# Patient Record
Sex: MALE | Race: BLACK OR AFRICAN AMERICAN | NOT HISPANIC OR LATINO | Employment: OTHER | URBAN - METROPOLITAN AREA
[De-identification: names, ages, dates, MRNs, and addresses within clinical notes are randomized per-mention and may not be internally consistent; named-entity substitution may affect disease eponyms.]

---

## 2024-08-03 ENCOUNTER — APPOINTMENT (OUTPATIENT)
Dept: RADIOLOGY | Facility: HOSPITAL | Age: 44
End: 2024-08-03

## 2024-08-03 ENCOUNTER — CLINICAL SUPPORT (OUTPATIENT)
Dept: EMERGENCY MEDICINE | Facility: HOSPITAL | Age: 44
End: 2024-08-03

## 2024-08-03 ENCOUNTER — HOSPITAL ENCOUNTER (OUTPATIENT)
Facility: HOSPITAL | Age: 44
Setting detail: OBSERVATION
Discharge: HOME | End: 2024-08-04
Attending: EMERGENCY MEDICINE | Admitting: STUDENT IN AN ORGANIZED HEALTH CARE EDUCATION/TRAINING PROGRAM

## 2024-08-03 DIAGNOSIS — R60.0 LEG EDEMA, LEFT: ICD-10-CM

## 2024-08-03 DIAGNOSIS — R06.02 SHORTNESS OF BREATH: Primary | ICD-10-CM

## 2024-08-03 DIAGNOSIS — I50.9 CONGESTIVE HEART FAILURE, UNSPECIFIED HF CHRONICITY, UNSPECIFIED HEART FAILURE TYPE (MULTI): ICD-10-CM

## 2024-08-03 DIAGNOSIS — L03.116 CELLULITIS OF LEFT LOWER EXTREMITY: ICD-10-CM

## 2024-08-03 PROBLEM — I10 BENIGN ESSENTIAL HYPERTENSION: Status: ACTIVE | Noted: 2023-02-22

## 2024-08-03 PROBLEM — R73.03 PREDIABETES: Status: ACTIVE | Noted: 2024-02-19

## 2024-08-03 PROBLEM — K21.9 GASTROESOPHAGEAL REFLUX DISEASE: Status: ACTIVE | Noted: 2023-02-22

## 2024-08-03 PROBLEM — J96.12 CHRONIC RESPIRATORY FAILURE WITH HYPERCAPNIA (MULTI): Status: ACTIVE | Noted: 2023-02-22

## 2024-08-03 PROBLEM — F41.0 PANIC DISORDER: Status: ACTIVE | Noted: 2023-02-22

## 2024-08-03 PROBLEM — K58.9 IRRITABLE BOWEL SYNDROME: Status: ACTIVE | Noted: 2023-02-22

## 2024-08-03 PROBLEM — G47.33 OBSTRUCTIVE SLEEP APNEA SYNDROME: Status: ACTIVE | Noted: 2023-02-22

## 2024-08-03 PROBLEM — E66.2 EXTREME OBESITY WITH ALVEOLAR HYPOVENTILATION (MULTI): Status: ACTIVE | Noted: 2024-08-03

## 2024-08-03 PROBLEM — M79.601 CHRONIC PAIN OF RIGHT UPPER EXTREMITY: Status: ACTIVE | Noted: 2023-02-22

## 2024-08-03 PROBLEM — G89.29 CHRONIC PAIN OF RIGHT UPPER EXTREMITY: Status: ACTIVE | Noted: 2023-02-22

## 2024-08-03 PROBLEM — L03.90 CELLULITIS: Status: ACTIVE | Noted: 2024-08-03

## 2024-08-03 LAB
ALBUMIN SERPL BCP-MCNC: 3.1 G/DL (ref 3.4–5)
ALP SERPL-CCNC: 61 U/L (ref 33–120)
ALT SERPL W P-5'-P-CCNC: 21 U/L (ref 10–52)
ANION GAP SERPL CALC-SCNC: 10 MMOL/L (ref 10–20)
APPEARANCE UR: CLEAR
AST SERPL W P-5'-P-CCNC: 16 U/L (ref 9–39)
BASOPHILS # BLD AUTO: 0.04 X10*3/UL (ref 0–0.1)
BASOPHILS NFR BLD AUTO: 0.6 %
BILIRUB SERPL-MCNC: 0.9 MG/DL (ref 0–1.2)
BILIRUB UR STRIP.AUTO-MCNC: NEGATIVE MG/DL
BNP SERPL-MCNC: 170 PG/ML (ref 0–99)
BUN SERPL-MCNC: 13 MG/DL (ref 6–23)
CALCIUM SERPL-MCNC: 8.8 MG/DL (ref 8.6–10.6)
CARDIAC TROPONIN I PNL SERPL HS: 15 NG/L (ref 0–53)
CARDIAC TROPONIN I PNL SERPL HS: 19 NG/L (ref 0–53)
CHLORIDE SERPL-SCNC: 102 MMOL/L (ref 98–107)
CO2 SERPL-SCNC: 37 MMOL/L (ref 21–32)
COLOR UR: NORMAL
CREAT SERPL-MCNC: 1.04 MG/DL (ref 0.5–1.3)
EGFRCR SERPLBLD CKD-EPI 2021: >90 ML/MIN/1.73M*2
EOSINOPHIL # BLD AUTO: 0.11 X10*3/UL (ref 0–0.7)
EOSINOPHIL NFR BLD AUTO: 1.8 %
ERYTHROCYTE [DISTWIDTH] IN BLOOD BY AUTOMATED COUNT: 18.8 % (ref 11.5–14.5)
FLUAV RNA RESP QL NAA+PROBE: NOT DETECTED
FLUBV RNA RESP QL NAA+PROBE: NOT DETECTED
GLUCOSE SERPL-MCNC: 82 MG/DL (ref 74–99)
GLUCOSE UR STRIP.AUTO-MCNC: NORMAL MG/DL
HCT VFR BLD AUTO: 50.1 % (ref 41–52)
HGB BLD-MCNC: 15.7 G/DL (ref 13.5–17.5)
HOLD SPECIMEN: NORMAL
IMM GRANULOCYTES # BLD AUTO: 0.01 X10*3/UL (ref 0–0.7)
IMM GRANULOCYTES NFR BLD AUTO: 0.2 % (ref 0–0.9)
KETONES UR STRIP.AUTO-MCNC: NEGATIVE MG/DL
LEUKOCYTE ESTERASE UR QL STRIP.AUTO: NEGATIVE
LYMPHOCYTES # BLD AUTO: 1.34 X10*3/UL (ref 1.2–4.8)
LYMPHOCYTES NFR BLD AUTO: 21.6 %
MAGNESIUM SERPL-MCNC: 1.89 MG/DL (ref 1.6–2.4)
MCH RBC QN AUTO: 27.6 PG (ref 26–34)
MCHC RBC AUTO-ENTMCNC: 31.3 G/DL (ref 32–36)
MCV RBC AUTO: 88 FL (ref 80–100)
MONOCYTES # BLD AUTO: 0.67 X10*3/UL (ref 0.1–1)
MONOCYTES NFR BLD AUTO: 10.8 %
NEUTROPHILS # BLD AUTO: 4.03 X10*3/UL (ref 1.2–7.7)
NEUTROPHILS NFR BLD AUTO: 65 %
NITRITE UR QL STRIP.AUTO: NEGATIVE
NRBC BLD-RTO: 0 /100 WBCS (ref 0–0)
PH UR STRIP.AUTO: 6.5 [PH]
PLATELET # BLD AUTO: 110 X10*3/UL (ref 150–450)
POTASSIUM SERPL-SCNC: 5 MMOL/L (ref 3.5–5.3)
PROT SERPL-MCNC: 6.3 G/DL (ref 6.4–8.2)
PROT UR STRIP.AUTO-MCNC: NEGATIVE MG/DL
RBC # BLD AUTO: 5.69 X10*6/UL (ref 4.5–5.9)
RBC # UR STRIP.AUTO: NEGATIVE /UL
SARS-COV-2 RNA RESP QL NAA+PROBE: NOT DETECTED
SODIUM SERPL-SCNC: 144 MMOL/L (ref 136–145)
SP GR UR STRIP.AUTO: 1.01
UROBILINOGEN UR STRIP.AUTO-MCNC: NORMAL MG/DL
WBC # BLD AUTO: 6.2 X10*3/UL (ref 4.4–11.3)

## 2024-08-03 PROCEDURE — 71046 X-RAY EXAM CHEST 2 VIEWS: CPT | Performed by: RADIOLOGY

## 2024-08-03 PROCEDURE — 99223 1ST HOSP IP/OBS HIGH 75: CPT | Performed by: NURSE PRACTITIONER

## 2024-08-03 PROCEDURE — 81003 URINALYSIS AUTO W/O SCOPE: CPT

## 2024-08-03 PROCEDURE — 84075 ASSAY ALKALINE PHOSPHATASE: CPT

## 2024-08-03 PROCEDURE — 71046 X-RAY EXAM CHEST 2 VIEWS: CPT

## 2024-08-03 PROCEDURE — 83735 ASSAY OF MAGNESIUM: CPT

## 2024-08-03 PROCEDURE — 71275 CT ANGIOGRAPHY CHEST: CPT | Performed by: STUDENT IN AN ORGANIZED HEALTH CARE EDUCATION/TRAINING PROGRAM

## 2024-08-03 PROCEDURE — G0378 HOSPITAL OBSERVATION PER HR: HCPCS

## 2024-08-03 PROCEDURE — 96365 THER/PROPH/DIAG IV INF INIT: CPT | Mod: 59

## 2024-08-03 PROCEDURE — 84484 ASSAY OF TROPONIN QUANT: CPT

## 2024-08-03 PROCEDURE — 87636 SARSCOV2 & INF A&B AMP PRB: CPT

## 2024-08-03 PROCEDURE — 80053 COMPREHEN METABOLIC PANEL: CPT

## 2024-08-03 PROCEDURE — 36415 COLL VENOUS BLD VENIPUNCTURE: CPT

## 2024-08-03 PROCEDURE — 2550000001 HC RX 255 CONTRASTS: Performed by: EMERGENCY MEDICINE

## 2024-08-03 PROCEDURE — 99285 EMERGENCY DEPT VISIT HI MDM: CPT | Performed by: EMERGENCY MEDICINE

## 2024-08-03 PROCEDURE — 93971 EXTREMITY STUDY: CPT | Performed by: STUDENT IN AN ORGANIZED HEALTH CARE EDUCATION/TRAINING PROGRAM

## 2024-08-03 PROCEDURE — 85025 COMPLETE CBC W/AUTO DIFF WBC: CPT

## 2024-08-03 PROCEDURE — 99285 EMERGENCY DEPT VISIT HI MDM: CPT | Mod: 25

## 2024-08-03 PROCEDURE — 2500000004 HC RX 250 GENERAL PHARMACY W/ HCPCS (ALT 636 FOR OP/ED): Mod: JZ

## 2024-08-03 PROCEDURE — 93971 EXTREMITY STUDY: CPT

## 2024-08-03 PROCEDURE — 93005 ELECTROCARDIOGRAM TRACING: CPT

## 2024-08-03 PROCEDURE — 71275 CT ANGIOGRAPHY CHEST: CPT

## 2024-08-03 PROCEDURE — 83880 ASSAY OF NATRIURETIC PEPTIDE: CPT

## 2024-08-03 PROCEDURE — 93010 ELECTROCARDIOGRAM REPORT: CPT | Performed by: EMERGENCY MEDICINE

## 2024-08-03 RX ORDER — FUROSEMIDE 10 MG/ML
40 INJECTION INTRAMUSCULAR; INTRAVENOUS EVERY 12 HOURS
Status: DISCONTINUED | OUTPATIENT
Start: 2024-08-04 | End: 2024-08-04 | Stop reason: HOSPADM

## 2024-08-03 RX ORDER — ENOXAPARIN SODIUM 100 MG/ML
40 INJECTION SUBCUTANEOUS EVERY 12 HOURS SCHEDULED
Status: DISCONTINUED | OUTPATIENT
Start: 2024-08-03 | End: 2024-08-04 | Stop reason: HOSPADM

## 2024-08-03 RX ORDER — AMOXICILLIN 250 MG
2 CAPSULE ORAL 2 TIMES DAILY
Status: DISCONTINUED | OUTPATIENT
Start: 2024-08-03 | End: 2024-08-04 | Stop reason: HOSPADM

## 2024-08-03 RX ORDER — CEFAZOLIN SODIUM 2 G/100ML
2 INJECTION, SOLUTION INTRAVENOUS EVERY 8 HOURS
Status: DISCONTINUED | OUTPATIENT
Start: 2024-08-03 | End: 2024-08-04 | Stop reason: HOSPADM

## 2024-08-03 RX ORDER — IBUPROFEN 200 MG
TABLET ORAL AS NEEDED
COMMUNITY

## 2024-08-03 RX ORDER — CEFAZOLIN SODIUM 1 G/50ML
1 SOLUTION INTRAVENOUS EVERY 8 HOURS
Status: DISCONTINUED | OUTPATIENT
Start: 2024-08-03 | End: 2024-08-03

## 2024-08-03 RX ORDER — FUROSEMIDE 10 MG/ML
40 INJECTION INTRAMUSCULAR; INTRAVENOUS ONCE
Status: COMPLETED | OUTPATIENT
Start: 2024-08-03 | End: 2024-08-04

## 2024-08-03 RX ADMIN — IOHEXOL 100 ML: 350 INJECTION, SOLUTION INTRAVENOUS at 16:15

## 2024-08-03 RX ADMIN — CEFAZOLIN SODIUM 2 G: 2 INJECTION, SOLUTION INTRAVENOUS at 14:13

## 2024-08-03 SDOH — HEALTH STABILITY: MENTAL HEALTH
STRESS IS WHEN SOMEONE FEELS TENSE, NERVOUS, ANXIOUS, OR CAN'T SLEEP AT NIGHT BECAUSE THEIR MIND IS TROUBLED. HOW STRESSED ARE YOU?: NOT AT ALL

## 2024-08-03 SDOH — ECONOMIC STABILITY: HOUSING INSECURITY: AT ANY TIME IN THE PAST 12 MONTHS, WERE YOU HOMELESS OR LIVING IN A SHELTER (INCLUDING NOW)?: NO

## 2024-08-03 SDOH — SOCIAL STABILITY: SOCIAL NETWORK: HOW OFTEN DO YOU GET TOGETHER WITH FRIENDS OR RELATIVES?: MORE THAN THREE TIMES A WEEK

## 2024-08-03 SDOH — SOCIAL STABILITY: SOCIAL NETWORK: HOW OFTEN DO YOU ATTEND CHURCH OR RELIGIOUS SERVICES?: MORE THAN 4 TIMES PER YEAR

## 2024-08-03 SDOH — ECONOMIC STABILITY: INCOME INSECURITY: IN THE LAST 12 MONTHS, WAS THERE A TIME WHEN YOU WERE NOT ABLE TO PAY THE MORTGAGE OR RENT ON TIME?: YES

## 2024-08-03 SDOH — SOCIAL STABILITY: SOCIAL INSECURITY: DO YOU FEEL UNSAFE GOING BACK TO THE PLACE WHERE YOU ARE LIVING?: NO

## 2024-08-03 SDOH — SOCIAL STABILITY: SOCIAL INSECURITY: DOES ANYONE TRY TO KEEP YOU FROM HAVING/CONTACTING OTHER FRIENDS OR DOING THINGS OUTSIDE YOUR HOME?: NO

## 2024-08-03 SDOH — SOCIAL STABILITY: SOCIAL INSECURITY: HAVE YOU HAD THOUGHTS OF HARMING ANYONE ELSE?: NO

## 2024-08-03 SDOH — HEALTH STABILITY: MENTAL HEALTH
HOW OFTEN DO YOU NEED TO HAVE SOMEONE HELP YOU WHEN YOU READ INSTRUCTIONS, PAMPHLETS, OR OTHER WRITTEN MATERIAL FROM YOUR DOCTOR OR PHARMACY?: NEVER

## 2024-08-03 SDOH — SOCIAL STABILITY: SOCIAL INSECURITY
WITHIN THE LAST YEAR, HAVE YOU BEEN KICKED, HIT, SLAPPED, OR OTHERWISE PHYSICALLY HURT BY YOUR PARTNER OR EX-PARTNER?: NO

## 2024-08-03 SDOH — SOCIAL STABILITY: SOCIAL INSECURITY
WITHIN THE LAST YEAR, HAVE TO BEEN RAPED OR FORCED TO HAVE ANY KIND OF SEXUAL ACTIVITY BY YOUR PARTNER OR EX-PARTNER?: NO

## 2024-08-03 SDOH — SOCIAL STABILITY: SOCIAL INSECURITY: WERE YOU ABLE TO COMPLETE ALL THE BEHAVIORAL HEALTH SCREENINGS?: YES

## 2024-08-03 SDOH — HEALTH STABILITY: MENTAL HEALTH: HOW OFTEN DO YOU HAVE 6 OR MORE DRINKS ON ONE OCCASION?: NEVER

## 2024-08-03 SDOH — SOCIAL STABILITY: SOCIAL INSECURITY: WITHIN THE LAST YEAR, HAVE YOU BEEN AFRAID OF YOUR PARTNER OR EX-PARTNER?: NO

## 2024-08-03 SDOH — ECONOMIC STABILITY: HOUSING INSECURITY: IN THE PAST 12 MONTHS, HOW MANY TIMES HAVE YOU MOVED WHERE YOU WERE LIVING?: 1

## 2024-08-03 SDOH — ECONOMIC STABILITY: FOOD INSECURITY: WITHIN THE PAST 12 MONTHS, YOU WORRIED THAT YOUR FOOD WOULD RUN OUT BEFORE YOU GOT MONEY TO BUY MORE.: NEVER TRUE

## 2024-08-03 SDOH — HEALTH STABILITY: PHYSICAL HEALTH: ON AVERAGE, HOW MANY MINUTES DO YOU ENGAGE IN EXERCISE AT THIS LEVEL?: 0 MIN

## 2024-08-03 SDOH — SOCIAL STABILITY: SOCIAL NETWORK
DO YOU BELONG TO ANY CLUBS OR ORGANIZATIONS SUCH AS CHURCH GROUPS UNIONS, FRATERNAL OR ATHLETIC GROUPS, OR SCHOOL GROUPS?: NO

## 2024-08-03 SDOH — SOCIAL STABILITY: SOCIAL INSECURITY: HAVE YOU HAD ANY THOUGHTS OF HARMING ANYONE ELSE?: NO

## 2024-08-03 SDOH — HEALTH STABILITY: MENTAL HEALTH: HOW MANY STANDARD DRINKS CONTAINING ALCOHOL DO YOU HAVE ON A TYPICAL DAY?: 1 OR 2

## 2024-08-03 SDOH — SOCIAL STABILITY: SOCIAL INSECURITY: WITHIN THE LAST YEAR, HAVE YOU BEEN HUMILIATED OR EMOTIONALLY ABUSED IN OTHER WAYS BY YOUR PARTNER OR EX-PARTNER?: NO

## 2024-08-03 SDOH — ECONOMIC STABILITY: TRANSPORTATION INSECURITY
IN THE PAST 12 MONTHS, HAS LACK OF TRANSPORTATION KEPT YOU FROM MEETINGS, WORK, OR FROM GETTING THINGS NEEDED FOR DAILY LIVING?: NO

## 2024-08-03 SDOH — SOCIAL STABILITY: SOCIAL INSECURITY: ARE THERE ANY APPARENT SIGNS OF INJURIES/BEHAVIORS THAT COULD BE RELATED TO ABUSE/NEGLECT?: NO

## 2024-08-03 SDOH — HEALTH STABILITY: MENTAL HEALTH: HOW OFTEN DO YOU HAVE A DRINK CONTAINING ALCOHOL?: MONTHLY OR LESS

## 2024-08-03 SDOH — HEALTH STABILITY: PHYSICAL HEALTH: ON AVERAGE, HOW MANY DAYS PER WEEK DO YOU ENGAGE IN MODERATE TO STRENUOUS EXERCISE (LIKE A BRISK WALK)?: 0 DAYS

## 2024-08-03 SDOH — ECONOMIC STABILITY: INCOME INSECURITY: HOW HARD IS IT FOR YOU TO PAY FOR THE VERY BASICS LIKE FOOD, HOUSING, MEDICAL CARE, AND HEATING?: VERY HARD

## 2024-08-03 SDOH — ECONOMIC STABILITY: TRANSPORTATION INSECURITY
IN THE PAST 12 MONTHS, HAS THE LACK OF TRANSPORTATION KEPT YOU FROM MEDICAL APPOINTMENTS OR FROM GETTING MEDICATIONS?: NO

## 2024-08-03 SDOH — SOCIAL STABILITY: SOCIAL INSECURITY: ABUSE: ADULT

## 2024-08-03 SDOH — SOCIAL STABILITY: SOCIAL NETWORK: ARE YOU MARRIED, WIDOWED, DIVORCED, SEPARATED, NEVER MARRIED, OR LIVING WITH A PARTNER?: DIVORCED

## 2024-08-03 SDOH — ECONOMIC STABILITY: FOOD INSECURITY: WITHIN THE PAST 12 MONTHS, THE FOOD YOU BOUGHT JUST DIDN'T LAST AND YOU DIDN'T HAVE MONEY TO GET MORE.: NEVER TRUE

## 2024-08-03 SDOH — ECONOMIC STABILITY: INCOME INSECURITY: IN THE PAST 12 MONTHS, HAS THE ELECTRIC, GAS, OIL, OR WATER COMPANY THREATENED TO SHUT OFF SERVICE IN YOUR HOME?: NO

## 2024-08-03 SDOH — SOCIAL STABILITY: SOCIAL INSECURITY: HAS ANYONE EVER THREATENED TO HURT YOUR FAMILY OR YOUR PETS?: NO

## 2024-08-03 SDOH — SOCIAL STABILITY: SOCIAL NETWORK: HOW OFTEN DO YOU ATTENT MEETINGS OF THE CLUB OR ORGANIZATION YOU BELONG TO?: NEVER

## 2024-08-03 SDOH — SOCIAL STABILITY: SOCIAL INSECURITY: ARE YOU OR HAVE YOU BEEN THREATENED OR ABUSED PHYSICALLY, EMOTIONALLY, OR SEXUALLY BY ANYONE?: NO

## 2024-08-03 SDOH — SOCIAL STABILITY: SOCIAL INSECURITY: DO YOU FEEL ANYONE HAS EXPLOITED OR TAKEN ADVANTAGE OF YOU FINANCIALLY OR OF YOUR PERSONAL PROPERTY?: NO

## 2024-08-03 SDOH — SOCIAL STABILITY: SOCIAL NETWORK: IN A TYPICAL WEEK, HOW MANY TIMES DO YOU TALK ON THE PHONE WITH FAMILY, FRIENDS, OR NEIGHBORS?: NEVER

## 2024-08-03 ASSESSMENT — ENCOUNTER SYMPTOMS
NEUROLOGICAL NEGATIVE: 1
EYES NEGATIVE: 1
GASTROINTESTINAL NEGATIVE: 1
MUSCULOSKELETAL NEGATIVE: 1
CONSTITUTIONAL NEGATIVE: 1
CARDIOVASCULAR NEGATIVE: 1
SHORTNESS OF BREATH: 1

## 2024-08-03 ASSESSMENT — LIFESTYLE VARIABLES
HAVE YOU EVER FELT YOU SHOULD CUT DOWN ON YOUR DRINKING: NO
HOW MANY STANDARD DRINKS CONTAINING ALCOHOL DO YOU HAVE ON A TYPICAL DAY: PATIENT DOES NOT DRINK
HOW OFTEN DO YOU HAVE A DRINK CONTAINING ALCOHOL: NEVER
TOTAL SCORE: 0
SKIP TO QUESTIONS 9-10: 1
HAVE PEOPLE ANNOYED YOU BY CRITICIZING YOUR DRINKING: NO
AUDIT-C TOTAL SCORE: 0
EVER HAD A DRINK FIRST THING IN THE MORNING TO STEADY YOUR NERVES TO GET RID OF A HANGOVER: NO
HOW OFTEN DO YOU HAVE 6 OR MORE DRINKS ON ONE OCCASION: NEVER
PRESCIPTION_ABUSE_PAST_12_MONTHS: NO
EVER FELT BAD OR GUILTY ABOUT YOUR DRINKING: NO
SUBSTANCE_ABUSE_PAST_12_MONTHS: YES
AUDIT-C TOTAL SCORE: 1
SKIP TO QUESTIONS 9-10: 1
AUDIT-C TOTAL SCORE: 0

## 2024-08-03 ASSESSMENT — COGNITIVE AND FUNCTIONAL STATUS - GENERAL
MOBILITY SCORE: 24
DAILY ACTIVITIY SCORE: 24
PATIENT BASELINE BEDBOUND: NO

## 2024-08-03 ASSESSMENT — PAIN SCALES - GENERAL
PAINLEVEL_OUTOF10: 7
PAINLEVEL_OUTOF10: 5 - MODERATE PAIN
PAINLEVEL_OUTOF10: 4

## 2024-08-03 ASSESSMENT — PATIENT HEALTH QUESTIONNAIRE - PHQ9
1. LITTLE INTEREST OR PLEASURE IN DOING THINGS: NOT AT ALL
SUM OF ALL RESPONSES TO PHQ9 QUESTIONS 1 & 2: 0
2. FEELING DOWN, DEPRESSED OR HOPELESS: NOT AT ALL

## 2024-08-03 ASSESSMENT — ACTIVITIES OF DAILY LIVING (ADL)
HEARING - RIGHT EAR: FUNCTIONAL
GROOMING: INDEPENDENT
PATIENT'S MEMORY ADEQUATE TO SAFELY COMPLETE DAILY ACTIVITIES?: YES
JUDGMENT_ADEQUATE_SAFELY_COMPLETE_DAILY_ACTIVITIES: YES
DRESSING YOURSELF: INDEPENDENT
WALKS IN HOME: INDEPENDENT
ADEQUATE_TO_COMPLETE_ADL: YES
TOILETING: INDEPENDENT
FEEDING YOURSELF: INDEPENDENT
HEARING - LEFT EAR: FUNCTIONAL
BATHING: INDEPENDENT

## 2024-08-03 ASSESSMENT — COLUMBIA-SUICIDE SEVERITY RATING SCALE - C-SSRS
2. HAVE YOU ACTUALLY HAD ANY THOUGHTS OF KILLING YOURSELF?: NO
1. IN THE PAST MONTH, HAVE YOU WISHED YOU WERE DEAD OR WISHED YOU COULD GO TO SLEEP AND NOT WAKE UP?: NO
6. HAVE YOU EVER DONE ANYTHING, STARTED TO DO ANYTHING, OR PREPARED TO DO ANYTHING TO END YOUR LIFE?: NO

## 2024-08-03 ASSESSMENT — PAIN - FUNCTIONAL ASSESSMENT: PAIN_FUNCTIONAL_ASSESSMENT: 0-10

## 2024-08-03 NOTE — ED PROVIDER NOTES
Emergency Department Provider Note        History of Present Illness     History provided by: Patient  Limitations to History: None  External Records Reviewed with Brief Summary: None    HPI:  Rome Casey is a 43 y.o. male with past medical history of sleep apnea presenting today for left leg swelling, urinary frequency, and shortness of breath.  Patient states this started about 10 days ago.  Patient endorses shortness of breath predominantly with exertion. Patient does smoke marijuana, denies recent travel, denies cough/hemoptysis, denies any history of clots.  Patient rarely uses alcohol and does use oxygen occasionally for sleep apnea.    Physical Exam   Triage vitals:  T 36.9 °C (98.5 °F)  HR (!) 102  BP (!) 146/103  RR 18  O2 (!) 83 % (92 on 3liters) None (Room air)    General: Awake, alert, in no acute distress  Eyes: Gaze conjugate.  No scleral icterus or injection  HENT: Normo-cephalic, atraumatic. No stridor  CV: Regular rate, regular rhythm. Posterior Tibial pulses 2+ bilaterally  Resp: Breathing non-labored, speaking in full sentences.  Clear to auscultation bilaterally. O2 sats are 94% on 3L.   MSK/Extremities: No gross bony deformities. Moving all extremities.  Left lower leg erythema with 1+ edema to the shin on the left.  0+ edema on the right.  Pain in the calf with dorsiflexion of the left ankle.  Left lower leg noticeably warmer than the right.  Skin: Warm. Appropriate color.  Erythema of the left lower leg.  Neuro: Alert. Oriented. Face symmetric. Speech is fluent.    Psych: Appropriate mood and affect    Medical Decision Making & ED Course   Medical Decision Makin y.o. male with past medical history of sleep apnea presenting today for left leg swelling, urinary frequency, and shortness of breath.  Upon initial evaluation he is well-appearing with stable vital signs.  Physical exam notable for satting 94% on 3 L of oxygen, and erythema/swelling/edema of the left lower  extremity.  Differential diagnosis includes DVT, covid, flu, PE, UTI, cellulitis, ACS.   UA was negative, additional labs revealed an elevated bicarbonate and BNP possibly related to chronic COPD and pulmonary hypertension.  Patient is COVID and influenza negative.  EKG indicated right axis deviation. CXR revealed cardiomegaly and moderate pleural effusion. CT imaging revealed no acute pathology, borderline right ventricle enlargement, and a 9mm ground glass nodularity.   Upon reevaluation he appears more fatigued and is now requiring 4 L of oxygen and satting at 94%.  Considering his new oxygen requirement and potential signs of right-sided heart large man it was deemed necessary for admission and further workup of shortness of breath.  DVT ultrasound still pending.  ----    Differential diagnoses considered include but are not limited to: DVT, covid, flu, PE, UTI, cellulitis     Social Determinants of Health which Significantly Impact Care: None identified     EKG Independent Interpretation: The EKG obtained at 0955 was independently interpreted by myself. It demonstrates sinus rhythm with a ventricular rate of 98. Right axis deviation. Intervals within normal limits. ST segments showed no signs of elevation or depression. Increase right axis deviation when compared to prior EKG from 03/2020.     Independent Result Review and Interpretation:  CT PE revealed no acute pathology, borderline right ventricle enlargement, and a 9mm ground glass nodularity.     Chronic conditions affecting the patient's care: As documented above in [facility]    The patient was discussed with the following consultants/services: Admission Coordinator who accepted the patient for admission    Care Considerations: As documented above in [facility]    ED Course:  Diagnoses as of 08/03/24 1849   Shortness of breath   Cellulitis of left lower extremity     Disposition   As a result of their workup, the patient will require admission to the hospital.  The  patient was informed of his diagnosis.  The patient was given the opportunity to ask questions and I answered them. The patient agreed to be admitted to the hospital.    Procedures     Patient seen and discussed with ED attending physician.    Andrés Davila DO  Emergency Medicine       Andrés Davila DO  Resident  08/03/24 [id number]

## 2024-08-03 NOTE — H&P
HPI     Mr Casey is a 43y male with PMHx: JESIKA who presented to the ED today for c/o left leg pain and swelling over the past 10 days.  States symptoms have been worsening and it hurts too much to walk on it anymore.  Patient also c/o SOB states he has had this for several years and he was diagnosed with JESIKA w/CPAP and used the CPAP for 2-3 years with no relief from the daytime SOB.   Patient states he only smoked for about 8 years and only did it socially and quit 7 years ago.  Lungs were clear and patient denies any chest pain, cough/congestion.  On exam his LLE was red, warm and swollen.    While in the ED patient's vitals were found to be WNL except BP was 154/101 and PO 83% on R/A doing well w/oxygen.  Patient Labs were also WNL except  but patient is obese.  CXR showed mild pulm edema and enlarged heart.  Patient to be admitted observation for LLE cellulitis.    ROS/EXAM     Review of Systems   Constitutional: Negative.    HENT: Negative.     Eyes: Negative.    Respiratory:  Positive for shortness of breath.    Cardiovascular: Negative.    Gastrointestinal: Negative.    Genitourinary: Negative.    Musculoskeletal: Negative.         LLE edema and pain   Skin: Negative.    Neurological: Negative.    All other systems reviewed and are negative.    Physical Exam  Vitals reviewed.   Constitutional:       Appearance: Normal appearance.   HENT:      Head: Normocephalic.      Mouth/Throat:      Mouth: Mucous membranes are dry.   Eyes:      Extraocular Movements: Extraocular movements intact.      Conjunctiva/sclera: Conjunctivae normal.      Pupils: Pupils are equal, round, and reactive to light.   Cardiovascular:      Rate and Rhythm: Normal rate and regular rhythm.      Pulses: Normal pulses.      Heart sounds: Normal heart sounds, S1 normal and S2 normal.   Pulmonary:      Effort: Pulmonary effort is normal.      Breath sounds: Normal breath sounds and air entry.   Abdominal:      General: Abdomen is  flat. Bowel sounds are normal.      Palpations: Abdomen is soft.   Musculoskeletal:         General: Normal range of motion.      Cervical back: Full passive range of motion without pain and normal range of motion.      Left lower leg: Edema present.   Skin:     General: Skin is warm and dry.   Neurological:      General: No focal deficit present.      Mental Status: He is alert and oriented to person, place, and time. Mental status is at baseline.   Psychiatric:         Attention and Perception: Attention normal.         Mood and Affect: Mood normal.         Speech: Speech normal.       Histories     Past Medical History:   Diagnosis Date    GERD (gastroesophageal reflux disease)     Hypertension     Other abnormal glucose 10/22/2020    Abnormal glucose    Other abnormal glucose 01/27/2020    Abnormal glucose    Other conditions influencing health status 07/11/2018    No pertinent past psychiatric history    Other specified health status 07/11/2018    No pertinent past medical history    Other specified health status 07/11/2018    No pertinent past surgical history    Personal history of other specified conditions 09/30/2019    History of snoring      Past Surgical History:   Procedure Laterality Date    OTHER SURGICAL HISTORY  01/11/2021    [place] tooth extraction      (Not in a hospital admission)    Allergies   Allergen Reactions    Shellfish Containing Products Anaphylaxis and Swelling    Codeine Hives and Unknown     Other reaction(s): Hives, Hives/Urticaria      Social History     Tobacco Use    Smoking status: Former     Types: Cigarettes    Smokeless tobacco: Never    Tobacco comments:     States smoked socially for about 8 years and has quit about 7 years ago   Substance Use Topics    Alcohol use: Not Currently      Family History   Problem Relation Name Age of Onset    Diabetes Mother's Brother      Diabetes Maternal Grandmother       Vitals/LABS/RESULTS     Last Recorded Vitals  Blood pressure (!)  "154/101, pulse 93, temperature 37 °C (98.6 °F), temperature source Oral, resp. rate 20, height 1.778 m (5' 10\"), weight (!) 154 kg (340 lb), SpO2 (!) 93%.  Intake/Output last 3 Shifts:  No intake/output data recorded.    Relevant Results  Lab Results   Component Value Date    WBC 6.2 08/03/2024    HGB 15.7 08/03/2024    HCT 50.1 08/03/2024    MCV 88 08/03/2024     (L) 08/03/2024      Lab Results   Component Value Date    GLUCOSE 82 08/03/2024    CALCIUM 8.8 08/03/2024     08/03/2024    K 5.0 08/03/2024    CO2 37 (H) 08/03/2024     08/03/2024    BUN 13 08/03/2024    CREATININE 1.04 08/03/2024     CT angio chest for pulmonary embolism    Result Date: 8/3/2024  Interpreted By:  Cameron Julio  and Eric Simpson STUDY: CT ANGIO CHEST FOR PULMONARY EMBOLISM;  8/3/2024 4:58 pm   INDICATION: Signs/Symptoms:left leg swelling and shortness of breath.   COMPARISON: CT abdomen and pelvis 06/24/2020 and CT PE 10/22/2019   ACCESSION NUMBER(S): [id number]   ORDERING CLINICIAN: ROLO TAYLOR   TECHNIQUE: Helical data acquisition of the chest was obtained after intravenous administration of 100 mL of Omnipaque 350, as per PE protocol. Images were reformatted in coronal and sagittal planes. Axial and coronal maximum intensity projection (MIP) images were created and reviewed.   FINDINGS: POTENTIAL LIMITATIONS OF THE STUDY: The assessment is limited by mild respiratory motion, high image noise and suboptimal contrast opacification of pulmonary arteries.   HEART AND VESSELS: No discrete filling defects within the main pulmonary artery or its branches to segmental level. Please note that, assessment of subsegmental branches is limited and small peripheral emboli are not entirely excluded. Main pulmonary artery and its branches are normal in caliber.   The thoracic aorta normal in course and caliber.Although, the study is not tailored for evaluation of aorta, there is no definite evidence of acute aortic " pathology. No coronary artery calcifications are seen. Please note, the study is not optimized for evaluation of coronary arteries.   There is borderline enlargement of right ventricle..There are no findings to suggest right heart strain.   There is no pericardial effusion seen.   MEDIASTINUM AND TARAN, LOWER NECK AND AXILLA: The visualized thyroid gland is within normal limits. No evidence of thoracic lymphadenopathy by CT criteria. Esophagus appears within normal limits as seen.   LUNGS AND AIRWAYS: The trachea and central airways are patent. No endobronchial lesion is seen.   There is a 9 mm ground-glass nodular density within the right lower lobe (image 190, series 402), new from prior CT scan dated 10/22/2019. There is a additional new 3 mm subpleural solid nodule within right middle lobe on axial image 160, series 402), which may represent a benign intrapulmonary lymph node. There is mild bandlike bibasilar atelectasis, right more than left. Otherwise, there is no focal consolidation, pleural effusion or pneumothorax. UPPER ABDOMEN: The visualized subdiaphragmatic structures demonstrate no remarkable findings.   CHEST WALL AND OSSEOUS STRUCTURES: Note is made of bilateral gynecomastia. Otherwise, chest wall is within normal limits. No acute osseous pathology.There are no suspicious osseous lesions.Mild multilevel degenerative changes within visualized spine.       1. No acute pathology, no evidence of acute pulmonary embolism to segmental level. Please note that, assessment of subsegmental branches is limited and small peripheral emboli are not entirely excluded. 2. Borderline enlargement of right ventricle. Further evaluation with echocardiogram can be obtained as clinically warranted. 3. A 9 mm ground-glass nodular density within right lower lobe, which is new when compared to prior CT scan from 2019. Attention on follow-up noncontrast chest CT in 6-12 months is recommended for persistence, then consider  chest CT every 2 years until 5 years.   I personally reviewed the images/study and I agree with the findings as stated by Calvin Chavarria MD (Radiology Resident). This study was interpreted at [facility], [place], [place].   MACRO: Incidental Finding:   A ground glass pulmonary nodule measuring 6 mm or larger.  (**-YCF-**)   Instructions:  Consider follow up non contrast chest CT at 6-12 months to confirm persistence, then consider CT chest every 2 years until 5 years. (Shaun Christy et al., Guidelines for management of incidental pulmonary nodules detected on CT images: From the Fleischner Society 2017, Radiology. 2017 Jul;284 (1):228-243.) FLEISCHNER.ACR.IF.8   Signed by: Cameron Julio 8/3/2024 5:08 PM Dictation workstation:   [id number]    XR chest 2 views    Result Date: 8/3/2024  Interpreted By:  Bret Hodgson and Bartolomei Aguilar Christopher STUDY: XR CHEST 2 VIEWS;  8/3/2024 11:00 am   INDICATION: Signs/Symptoms:shortness of breath.   COMPARISON: Chest radiograph 03/17/2020   ACCESSION NUMBER(S): [id number]   ORDERING CLINICIAN: CRYSTAL RAMON   FINDINGS: PA and lateral radiographs of the chest were provided.       CARDIOMEDIASTINAL SILHOUETTE: The cardiomediastinal silhouette is mildly enlarged.   LUNGS: Bilateral perihilar and interstitial prominence, which in the setting of cardiomegaly may reflect pulmonary edema, and is to be correlated patient's volume status. No focal consolidation. No sizable pleural effusion. No pneumothorax.   ABDOMEN: No remarkable upper abdominal findings.   BONES: No acute osseous changes.       1.  Cardiomegaly with findings suggestive of mild pulmonary edema, to be correlated with patient's volume status. Otherwise, no focal consolidation, sizeable pleural effusion or pneumothorax.   I personally reviewed the images/study and I agree with the findings as stated by Calvin Chavarria MD (Radiology Resident).  This study was interpreted at [facility], [place], [place].   MACRO: None   Signed by: Bret Hodgson 8/3/2024 11:34 AM Dictation workstation:   [id number]      Medications     Scheduled medications  ceFAZolin, 2 g, intravenous, q8h  enoxaparin, 40 mg, subcutaneous, q12h NAZANIN  furosemide, 40 mg, intravenous, Once  sennosides-docusate sodium, 2 tablet, oral, BID      Continuous medications     PRN medications      PLAN     Mr Casey is a 43y male with PMHx: JESIKA who presented to the ED today for c/o left leg pain and swelling over the past 10 days.  States symptoms have been worsening and it hurts too much to walk on it anymore.  Patient also c/o SOB states he has had this for several years and he was diagnosed with JESIKA w/CPAP and used the CPAP for 2-3 years with no relief from the daytime SOB.   Patient states he only smoked for about 8 years and only did it socially and quit 7 years ago.  Lungs were clear and patient denies any chest pain, cough/congestion.  On exam his LLE was red, warm and swollen.    While in the ED patient's vitals were found to be WNL except BP was 154/101 and PO 83% on R/A doing well w/oxygen.  Patient Labs were also WNL except  but patient is obese.  CXR showed mild pulm edema and enlarged heart.  Patient to be admitted observation for LLE cellulitis.  Assessment/Plan:    LLE Cellulits  -DVT U/S pending  -will continue with Cefazolin 2gm IV q8hr as started in ED will transition to PO    JESIKA  SOB  -will need outpatient follow up with Pulm and PCP and will order outpatient Echo  -Lasix 40mg IV x1 dose  -strict I/O    Fluids: monitor and replete as needed  Electrolytes: monitor and replete as needed  Nutrition: Regular diet   GI prophylaxis: as needed  DVT prophylaxis: SCD/Lovenox  Code Status: Full Code  PCP: Needs PCP  [facility]: [facility]    Disposition:  Admitted for above diagnoses. Plan per above. Anticipate  observation stay.      Eugenie Keith, APRN-CNP         I spent 75 minutes in the professional and overall care on this encounter before, during and after the visit, examining the patient, reviewing labs, writing orders and documenting the note

## 2024-08-04 VITALS
HEIGHT: 70 IN | SYSTOLIC BLOOD PRESSURE: 147 MMHG | DIASTOLIC BLOOD PRESSURE: 94 MMHG | TEMPERATURE: 97.7 F | HEART RATE: 93 BPM | OXYGEN SATURATION: 95 % | WEIGHT: 315 LBS | BODY MASS INDEX: 45.1 KG/M2 | RESPIRATION RATE: 18 BRPM

## 2024-08-04 LAB
ATRIAL RATE: 98 BPM
P AXIS: 56 DEGREES
P OFFSET: 181 MS
P ONSET: 129 MS
PR INTERVAL: 170 MS
Q ONSET: 214 MS
QRS COUNT: 16 BEATS
QRS DURATION: 84 MS
QT INTERVAL: 354 MS
QTC CALCULATION(BAZETT): 451 MS
QTC FREDERICIA: 417 MS
R AXIS: 154 DEGREES
T AXIS: 33 DEGREES
T OFFSET: 391 MS
VENTRICULAR RATE: 98 BPM

## 2024-08-04 PROCEDURE — G0378 HOSPITAL OBSERVATION PER HR: HCPCS

## 2024-08-04 PROCEDURE — 2500000004 HC RX 250 GENERAL PHARMACY W/ HCPCS (ALT 636 FOR OP/ED): Mod: JZ | Performed by: NURSE PRACTITIONER

## 2024-08-04 PROCEDURE — 2500000004 HC RX 250 GENERAL PHARMACY W/ HCPCS (ALT 636 FOR OP/ED): Performed by: STUDENT IN AN ORGANIZED HEALTH CARE EDUCATION/TRAINING PROGRAM

## 2024-08-04 PROCEDURE — 96375 TX/PRO/DX INJ NEW DRUG ADDON: CPT

## 2024-08-04 PROCEDURE — 96376 TX/PRO/DX INJ SAME DRUG ADON: CPT

## 2024-08-04 PROCEDURE — 2500000004 HC RX 250 GENERAL PHARMACY W/ HCPCS (ALT 636 FOR OP/ED): Performed by: NURSE PRACTITIONER

## 2024-08-04 PROCEDURE — 99239 HOSP IP/OBS DSCHRG MGMT >30: CPT | Performed by: STUDENT IN AN ORGANIZED HEALTH CARE EDUCATION/TRAINING PROGRAM

## 2024-08-04 PROCEDURE — 96372 THER/PROPH/DIAG INJ SC/IM: CPT | Performed by: NURSE PRACTITIONER

## 2024-08-04 PROCEDURE — 2500000001 HC RX 250 WO HCPCS SELF ADMINISTERED DRUGS (ALT 637 FOR MEDICARE OP): Performed by: NURSE PRACTITIONER

## 2024-08-04 RX ORDER — SULFAMETHOXAZOLE AND TRIMETHOPRIM 800; 160 MG/1; MG/1
1 TABLET ORAL 2 TIMES DAILY
Qty: 14 TABLET | Refills: 0 | Status: SHIPPED | OUTPATIENT
Start: 2024-08-04 | End: 2024-08-11

## 2024-08-04 RX ORDER — ACETAMINOPHEN 325 MG/1
650 TABLET ORAL EVERY 6 HOURS PRN
Status: DISCONTINUED | OUTPATIENT
Start: 2024-08-04 | End: 2024-08-04 | Stop reason: HOSPADM

## 2024-08-04 RX ORDER — FUROSEMIDE 40 MG/1
40 TABLET ORAL DAILY
Qty: 5 TABLET | Refills: 0 | Status: SHIPPED | OUTPATIENT
Start: 2024-08-04 | End: 2024-08-09

## 2024-08-04 RX ORDER — HYDRALAZINE HYDROCHLORIDE 20 MG/ML
10 INJECTION INTRAMUSCULAR; INTRAVENOUS EVERY 4 HOURS PRN
Status: DISCONTINUED | OUTPATIENT
Start: 2024-08-04 | End: 2024-08-04 | Stop reason: HOSPADM

## 2024-08-04 RX ORDER — BUTALBITAL, ACETAMINOPHEN AND CAFFEINE 50; 325; 40 MG/1; MG/1; MG/1
1 TABLET ORAL EVERY 4 HOURS PRN
Status: DISCONTINUED | OUTPATIENT
Start: 2024-08-04 | End: 2024-08-04

## 2024-08-04 RX ADMIN — SENNOSIDES AND DOCUSATE SODIUM 2 TABLET: 50; 8.6 TABLET ORAL at 01:52

## 2024-08-04 RX ADMIN — ACETAMINOPHEN 650 MG: 325 TABLET ORAL at 09:58

## 2024-08-04 RX ADMIN — CEFAZOLIN SODIUM 2 G: 2 INJECTION, SOLUTION INTRAVENOUS at 10:41

## 2024-08-04 RX ADMIN — CEFAZOLIN SODIUM 2 G: 2 INJECTION, SOLUTION INTRAVENOUS at 01:45

## 2024-08-04 RX ADMIN — ENOXAPARIN SODIUM 40 MG: 100 INJECTION SUBCUTANEOUS at 01:46

## 2024-08-04 RX ADMIN — FUROSEMIDE 40 MG: 10 INJECTION, SOLUTION INTRAVENOUS at 09:48

## 2024-08-04 RX ADMIN — FUROSEMIDE 40 MG: 10 INJECTION, SOLUTION INTRAMUSCULAR; INTRAVENOUS at 01:46

## 2024-08-04 RX ADMIN — HYDRALAZINE HYDROCHLORIDE 10 MG: 20 INJECTION INTRAMUSCULAR; INTRAVENOUS at 09:48

## 2024-08-04 ASSESSMENT — COGNITIVE AND FUNCTIONAL STATUS - GENERAL
DAILY ACTIVITIY SCORE: 24
MOBILITY SCORE: 24

## 2024-08-04 ASSESSMENT — PAIN SCALES - GENERAL: PAINLEVEL_OUTOF10: 3

## 2024-08-04 NOTE — CARE PLAN
The patient's goals for the shift include      The clinical goals for the shift include      Over the shift, the patient did not make progress toward the following goals. Barriers to progression include PT denies pain . Recommendations to address these barriers include frequent monitor for pain.

## 2024-08-04 NOTE — CARE PLAN
Problem: Pain - Adult  Goal: Verbalizes/displays adequate comfort level or baseline comfort level  8/4/2024 1146 by Christiane Goodwin RN  Outcome: Progressing  8/4/2024 1145 by Christiane Goodwin RN  Outcome: Progressing     Problem: Safety - Adult  Goal: Free from fall injury  8/4/2024 1146 by Christiane Goodwin RN  Outcome: Progressing  8/4/2024 1145 by Christiane Goodwin RN  Outcome: Progressing     Problem: Discharge Planning  Goal: Discharge to home or other facility with appropriate resources  8/4/2024 1146 by Christiane Goodwin RN  Outcome: Progressing  8/4/2024 1145 by Christiane Goodwin RN  Outcome: Progressing     Problem: Chronic Conditions and Co-morbidities  Goal: Patient's chronic conditions and co-morbidity symptoms are monitored and maintained or improved  8/4/2024 1146 by Christiane Goodwin RN  Outcome: Progressing  8/4/2024 1145 by Christiane Goodwin RN  Outcome: Progressing     Problem: Pain  Goal: Takes deep breaths with improved pain control throughout the shift  8/4/2024 1146 by Christiane Goodwin RN  Outcome: Progressing  8/4/2024 1145 by Christiane Goodwin RN  Outcome: Progressing  Goal: Turns in bed with improved pain control throughout the shift  8/4/2024 1146 by Christiane Goodwin RN  Outcome: Progressing  8/4/2024 1145 by Christiane Goodwin RN  Outcome: Progressing  Goal: Walks with improved pain control throughout the shift  8/4/2024 1146 by Christiane Goodwin RN  Outcome: Progressing  8/4/2024 1145 by Christiane Goodwin RN  Outcome: Progressing  Goal: Performs ADL's with improved pain control throughout shift  8/4/2024 1146 by Christiane Goodwin RN  Outcome: Progressing  8/4/2024 1145 by Christiane Goodwin RN  Outcome: Progressing  Goal: Participates in PT with improved pain control throughout the shift  8/4/2024 1146 by Christiane Goodwin RN  Outcome: Progressing  8/4/2024 1145 by Christiane Goodwin RN  Outcome: Progressing  Goal: Free from opioid side effects throughout the shift  8/4/2024 1146 by  Christiane Goodwin RN  Outcome: Progressing  8/4/2024 1145 by Christiane Goodwin RN  Outcome: Progressing  Goal: Free from acute confusion related to pain meds throughout the shift  8/4/2024 1146 by Christiane Goodwin RN  Outcome: Progressing  8/4/2024 1145 by Christiane Goodwin RN  Outcome: Progressing   The patient's goals for the shift include      The clinical goals for the shift include pt to remain pain free

## 2024-08-04 NOTE — DISCHARGE SUMMARY
INTERNAL MEDICINE DISCHARGE SUMMARY             Discharge Diagnosis   Leg edema, left    Issues Requiring Follow-Up   Outpatient echo    Test Results Pending At Discharge     Pending Labs       No current pending labs.          Hospital Course     From [facility]:   Mr Casey is a 43y male with PMHx: JESIKA who presented to the ED today for c/o left leg pain and swelling over the past 10 days.  States symptoms have been worsening and it hurts too much to walk on it anymore.  Patient also c/o SOB states he has had this for several years and he was diagnosed with JESIKA w/CPAP and used the CPAP for 2-3 years with no relief from the daytime SOB.   Patient states he only smoked for about 8 years and only did it socially and quit 7 years ago.  Lungs were clear and patient denies any chest pain, cough/congestion.  On exam his LLE was red, warm and swollen. While in the ED patient's vitals were found to be WNL except BP was 154/101 and PO 83% on R/A doing well w/oxygen.  Patient Labs were also WNL except  but patient is obese.  CXR showed mild pulm edema and enlarged heart.  Patient to be admitted observation for LLE cellulitis.    Hospital course:   Patient was started on IV antibiotic. He was placed on 3L nasal canula, and successfully weaned off.  For elevated BNP patient received IV Lasix with recommendation to follow up with outpatient echocardiogram.      Pertinent Physical Exam At Time of Discharge     Physical Exam  Constitutional:       Appearance: Normal appearance.   Cardiovascular:      Rate and Rhythm: Normal rate and regular rhythm.      Pulses: Normal pulses.      Heart sounds: Normal heart sounds, S1 normal and S2 normal.   Pulmonary:      Effort: Pulmonary effort is normal.      Breath sounds: Normal breath sounds and air entry.   Abdominal:      General: Abdomen is flat. Bowel sounds are normal.      Palpations: Abdomen is soft.   Musculoskeletal:          General: Normal range of motion.      Cervical back: Full passive range of motion without pain and normal range of motion.      Left lower leg: Edema present.   Skin:     General: Skin is warm and dry.   Neurological:      General: No focal deficit present.      Mental Status: He is alert and oriented to person, place, and time. Mental status is at baseline.   Psychiatric:         Attention and Perception: Attention normal.         Mood and Affect: Mood normal.         Speech: Speech normal.   Home Medications        Medication List      START taking these medications     furosemide 40 mg tablet; Commonly known as: Lasix; Take 1 tablet (40 mg)   by mouth once daily for 5 days.   sulfamethoxazole-trimethoprim 800-160 mg tablet; Commonly known as:   Bactrim DS; Take 1 tablet by mouth 2 times a day for 7 days.     CONTINUE taking these medications     glucose 4 gram chewable tablet     Outpatient Follow-Up     No future appointments.    Bib Saenz MD

## 2024-08-04 NOTE — CARE PLAN
Problem: Pain - Adult  Goal: Verbalizes/displays adequate comfort level or baseline comfort level  Outcome: Progressing     Problem: Safety - Adult  Goal: Free from fall injury  Outcome: Progressing     Problem: Discharge Planning  Goal: Discharge to home or other facility with appropriate resources  Outcome: Progressing     Problem: Chronic Conditions and Co-morbidities  Goal: Patient's chronic conditions and co-morbidity symptoms are monitored and maintained or improved  Outcome: Progressing     Problem: Pain  Goal: Takes deep breaths with improved pain control throughout the shift  Outcome: Progressing  Goal: Turns in bed with improved pain control throughout the shift  Outcome: Progressing  Goal: Walks with improved pain control throughout the shift  Outcome: Progressing  Goal: Performs ADL's with improved pain control throughout shift  Outcome: Progressing  Goal: Participates in PT with improved pain control throughout the shift  Outcome: Progressing  Goal: Free from opioid side effects throughout the shift  Outcome: Progressing  Goal: Free from acute confusion related to pain meds throughout the shift  Outcome: Progressing   The patient's goals for the shift include      The clinical goals for the shift include remain free of pain

## 2025-04-27 ENCOUNTER — HOSPITAL ENCOUNTER (EMERGENCY)
Facility: HOSPITAL | Age: 45
Discharge: HOME | End: 2025-04-27
Attending: EMERGENCY MEDICINE
Payer: MEDICAID

## 2025-04-27 VITALS
DIASTOLIC BLOOD PRESSURE: 79 MMHG | HEIGHT: 70 IN | OXYGEN SATURATION: 99 % | SYSTOLIC BLOOD PRESSURE: 107 MMHG | RESPIRATION RATE: 18 BRPM | HEART RATE: 90 BPM | BODY MASS INDEX: 45.1 KG/M2 | WEIGHT: 315 LBS | TEMPERATURE: 98.1 F

## 2025-04-27 DIAGNOSIS — B30.9 VIRAL CONJUNCTIVITIS OF BOTH EYES: Primary | ICD-10-CM

## 2025-04-27 PROCEDURE — 99282 EMERGENCY DEPT VISIT SF MDM: CPT | Performed by: EMERGENCY MEDICINE

## 2025-04-27 PROCEDURE — 99284 EMERGENCY DEPT VISIT MOD MDM: CPT

## 2025-04-27 RX ORDER — TETRACAINE HYDROCHLORIDE 5 MG/ML
1 SOLUTION OPHTHALMIC ONCE
Status: DISCONTINUED | OUTPATIENT
Start: 2025-04-27 | End: 2025-04-27 | Stop reason: HOSPADM

## 2025-04-27 ASSESSMENT — COLUMBIA-SUICIDE SEVERITY RATING SCALE - C-SSRS
2. HAVE YOU ACTUALLY HAD ANY THOUGHTS OF KILLING YOURSELF?: NO
6. HAVE YOU EVER DONE ANYTHING, STARTED TO DO ANYTHING, OR PREPARED TO DO ANYTHING TO END YOUR LIFE?: NO
1. IN THE PAST MONTH, HAVE YOU WISHED YOU WERE DEAD OR WISHED YOU COULD GO TO SLEEP AND NOT WAKE UP?: NO

## 2025-04-27 ASSESSMENT — PAIN - FUNCTIONAL ASSESSMENT: PAIN_FUNCTIONAL_ASSESSMENT: 0-10

## 2025-04-27 ASSESSMENT — PAIN DESCRIPTION - LOCATION: LOCATION: EYE

## 2025-04-27 ASSESSMENT — PAIN DESCRIPTION - ORIENTATION: ORIENTATION: RIGHT

## 2025-04-27 ASSESSMENT — PAIN SCALES - GENERAL: PAINLEVEL_OUTOF10: 3

## 2025-04-27 NOTE — ED TRIAGE NOTES
Patient presents to the Emergency department with a chief complaint of eye pain, redness, and sensitivity to light. Patient was seen at [facility] a few weeks ago, prescribed antibiotics and other medications. Patient states his symptoms got better and have now returned. Patient was seen at urgent care, told him to continue medications. Patient came in for another opinion. Symptoms are worse in right eye

## 2025-04-27 NOTE — DISCHARGE INSTRUCTIONS
You are seen and evaluated in the ED today for eye discomfort.  Overall examination was reassuring.  It is recommended that you follow-up with ophthalmology, they will be calling tomorrow to schedule an appointment for later this week.  You should continue to use your artificial tears at least on an hourly basis.  You should also apply cold compresses as tolerated for symptomatic relief.  Otherwise you may return to the ED for any new or worsening symptoms including but not limited to pain, vision changes, fevers.

## 2025-04-27 NOTE — ED PROVIDER NOTES
History of Present Illness       History provided by: Patient  Limitations to History: None    HPI:  HPI   Patient is a 44-year-old male with a medical history of HTN and GERD that presents to the ED for eye problem.  He admits to bilateral eye redness over the last month.  He states that the eyes are occasionally irritated but not painful.  He does have a significant amount of tearing from the eyes throughout the day.  Occasionally he wakes up with minimal crusting over his lashes but denies pus or purulent drainage.  He is not having any visual changes.  Denies associated fevers, nausea, vomiting, abdominal pain.  No recent cough, congestion, sore throat.  He has been seen on 3 previous occasions over the past month.  Initially was on antibiotic drops with minimal improvement.  He did see optometry which recommended artificial tears.  No one else has similar symptoms at home.      Physical Exam   Physical Exam  Vitals and nursing note reviewed.   Constitutional:       General: He is not in acute distress.     Appearance: Normal appearance. He is not toxic-appearing.   HENT:      Head: Normocephalic and atraumatic.   Eyes:      General: No visual field deficit.        Right eye: No discharge.         Left eye: No discharge.      Extraocular Movements: Extraocular movements intact.      Right eye: No nystagmus.      Left eye: No nystagmus.      Conjunctiva/sclera:      Right eye: Right conjunctiva is injected. Chemosis present. No exudate.     Left eye: Left conjunctiva is injected. No chemosis or exudate.     Pupils: Pupils are equal, round, and reactive to light.      Comments: Bilateral tearing.   Cardiovascular:      Heart sounds: Normal heart sounds.   Pulmonary:      Effort: Pulmonary effort is normal.      Breath sounds: Normal breath sounds.   Musculoskeletal:         General: No tenderness or signs of injury. Normal range of motion.      Cervical back: Normal range of motion and neck supple. No  rigidity.   Lymphadenopathy:      Cervical: No cervical adenopathy.   Skin:     General: Skin is warm and dry.      Capillary Refill: Capillary refill takes less than 2 seconds.      Findings: No erythema or rash.   Neurological:      General: No focal deficit present.      Mental Status: He is alert and oriented to person, place, and time.   Psychiatric:         Mood and Affect: Mood normal.         Behavior: Behavior normal.                      Medical Decision Making & ED Course     ED Course & MDM       Medical Decision Making:  Medical Decision Making     ----  Patient is a 44-year-old male with a medical history of HTN and GERD that presents to the ED for eye problem.  History obtained from patient. History and physical exam are as documented above. Vitals stable. Patient was seen and discussed with Dr. Cooper. Differential diagnoses considered include but are not limited to: Viral conjunctivitis, allergic conjunctivitis, bacterial conjunctivitis, orbital cellulitis, periorbital cellulitis, corneal abrasion, corneal ulcer.    Patient resting in ED exam chair and eyes appear actively tearing on exam.  Significant injection bilateral with chemosis affecting the right eye.  Fluorescein eye exam does not reveal any acute abrasions or ulceration to the cornea.  No associated pain with extraocular eye movements or headache.  No fevers or chills.  No surrounding skin changes suggestive of cellulitis.  He has no associated vision changes.  He has been seen multiple times within the past month including once by optometry.  He was diagnosed with viral conjunctivitis and recommended to take symptomatic measures including eyedrops which he has been compliant with.  Patient presents today for a second opinion because this is a having an impact on his daily life at this point.  We did talk with ophthalmology briefly in the ED who made a note to their  to get this patient in this week.  Patient is instructed to  "otherwise apply cold compresses as tolerated and use artificial tears regularly.  Patient is in agreement with this plan and discharged in stable condition.             Visit Vitals  /79   Pulse 90   Temp 36.7 °C (98.1 °F)   Resp 18   Ht 1.778 m (5' 10\")   Wt (!) 159 kg (350 lb)   SpO2 99%   BMI 50.22 kg/m²   Smoking Status Former   BSA 2.8 m²        Labs Reviewed - No data to display    No orders to display       ED Course:  ED Course as of 04/27/25 1348   Sun Apr 27, 2025   1342 Attending MDM:    Patient is a 44-year-old male here for persistent watery drainage from his eyes, right greater than left.  Patient also has bilateral conjunctival injection.  He was diagnosed with epidemic keratoconjunctivitis and has been using artificial tears 4-6 times per day.  He has not used cold compress.  Patient is here for persistent symptoms.  On exam he is not photophobic.  He does have significant conjunctival injection and ecchymoses.  There is mild supraorbital edema without overlying erythema.  There is no evidence of corneal ulceration or abrasion on fluorescein staining.  Patient was counseled on the importance of utilization of cool compress and liberal use of artificial tears.  Outpatient ophthalmology follow-up will also be arranged.    Natalia Cooper MD   [BR]      ED Course User Index  [BR] Natalia Cooper MD         Diagnoses as of 04/27/25 1348   Viral conjunctivitis of both eyes         Disposition   Discharged in stable condition.          Joselin Casey PA-C  Emergency Medicine      Joselin Casey PA-C  04/29/25 2102    "

## 2025-04-30 ENCOUNTER — OFFICE VISIT (OUTPATIENT)
Dept: OPHTHALMOLOGY | Facility: CLINIC | Age: 45
End: 2025-04-30
Payer: MEDICAID

## 2025-04-30 DIAGNOSIS — H01.02A SQUAMOUS BLEPHARITIS OF UPPER AND LOWER EYELIDS OF BOTH EYES: ICD-10-CM

## 2025-04-30 DIAGNOSIS — H01.02B SQUAMOUS BLEPHARITIS OF UPPER AND LOWER EYELIDS OF BOTH EYES: ICD-10-CM

## 2025-04-30 DIAGNOSIS — H16.8 MARGINAL KERATITIS: Primary | ICD-10-CM

## 2025-04-30 PROCEDURE — 92002 INTRM OPH EXAM NEW PATIENT: CPT | Performed by: OPHTHALMOLOGY

## 2025-04-30 RX ORDER — TOBRAMYCIN AND DEXAMETHASONE 3; 1 MG/ML; MG/ML
2 SUSPENSION/ DROPS OPHTHALMIC 4 TIMES DAILY
Qty: 5 ML | Refills: 1 | Status: SHIPPED | OUTPATIENT
Start: 2025-04-30 | End: 2025-05-14

## 2025-04-30 ASSESSMENT — ENCOUNTER SYMPTOMS
EYES NEGATIVE: 1
ENDOCRINE NEGATIVE: 0
HEMATOLOGIC/LYMPHATIC NEGATIVE: 0
CONSTITUTIONAL NEGATIVE: 0
RESPIRATORY NEGATIVE: 0
CARDIOVASCULAR NEGATIVE: 0
MUSCULOSKELETAL NEGATIVE: 0
ALLERGIC/IMMUNOLOGIC NEGATIVE: 0
GASTROINTESTINAL NEGATIVE: 0
PSYCHIATRIC NEGATIVE: 0
NEUROLOGICAL NEGATIVE: 0

## 2025-04-30 ASSESSMENT — EXTERNAL EXAM - LEFT EYE: OS_EXAM: NORMAL

## 2025-04-30 ASSESSMENT — TONOMETRY
OS_IOP_MMHG: 12
OD_IOP_MMHG: 12
IOP_METHOD: GOLDMANN APPLANATION

## 2025-04-30 ASSESSMENT — EXTERNAL EXAM - RIGHT EYE: OD_EXAM: NORMAL

## 2025-04-30 ASSESSMENT — VISUAL ACUITY
METHOD: SNELLEN - LINEAR
OD_SC: 20/30
OS_SC: 20/20

## 2025-04-30 ASSESSMENT — SLIT LAMP EXAM - LIDS: COMMENTS: MILD CRUSTING

## 2025-04-30 NOTE — PROGRESS NOTES
Assessment/Plan   Diagnoses and all orders for this visit:  Marginal keratitis  -     tobramycin-dexamethasone (Tobradex) ophthalmic suspension; Administer 2 drops into both eyes 4 times a day for 14 days.    Start Tobtadex ophthalmic solution both eyes qid  -continue with Systane drops qid    Squamous blepharitis of upper and lower eyelids of both eyes  .Start warm compresses both eyes 2  times a day for 30 minutes  Lid scrubs after compresses with Jaya's baby shampoo diluted in water both eyes 2  times a day    -continue with Erythromycin ophthalmic mer to eyelids after compresses and scrubs    Return in  1 week  for follow up or sooner if having any problems

## 2025-05-07 ENCOUNTER — APPOINTMENT (OUTPATIENT)
Dept: OPHTHALMOLOGY | Facility: CLINIC | Age: 45
End: 2025-05-07
Payer: MEDICAID

## 2025-05-07 DIAGNOSIS — H01.02B SQUAMOUS BLEPHARITIS OF UPPER AND LOWER EYELIDS OF BOTH EYES: ICD-10-CM

## 2025-05-07 DIAGNOSIS — H16.8 MARGINAL KERATITIS: Primary | ICD-10-CM

## 2025-05-07 DIAGNOSIS — H01.02A SQUAMOUS BLEPHARITIS OF UPPER AND LOWER EYELIDS OF BOTH EYES: ICD-10-CM

## 2025-05-07 PROCEDURE — 92012 INTRM OPH EXAM EST PATIENT: CPT | Performed by: OPHTHALMOLOGY

## 2025-05-07 RX ORDER — ERYTHROMYCIN 5 MG/G
OINTMENT OPHTHALMIC
Qty: 3.5 G | Refills: 1 | Status: SHIPPED | OUTPATIENT
Start: 2025-05-07 | End: 2025-05-17

## 2025-05-07 ASSESSMENT — TONOMETRY
IOP_METHOD: GOLDMANN APPLANATION
OD_IOP_MMHG: 10
OS_IOP_MMHG: 12

## 2025-05-07 ASSESSMENT — SLIT LAMP EXAM - LIDS: COMMENTS: MINIMAL CRUSTING

## 2025-05-07 ASSESSMENT — VISUAL ACUITY
OS_SC: 20/25
OD_SC: 20/20
METHOD: SNELLEN - LINEAR

## 2025-05-07 ASSESSMENT — ENCOUNTER SYMPTOMS: EYES NEGATIVE: 1

## 2025-05-07 ASSESSMENT — EXTERNAL EXAM - LEFT EYE: OS_EXAM: NORMAL

## 2025-05-07 ASSESSMENT — EXTERNAL EXAM - RIGHT EYE: OD_EXAM: NORMAL

## 2025-05-07 NOTE — PROGRESS NOTES
Assessment/Plan   Diagnoses and all orders for this visit:  Marginal keratitis  -improving in both eyes  -continue with Tobradex ophthalmic solution both eyes qid  -continue with artificial tears both eyes qid      Squamous blepharitis of upper and lower eyelids of both eyes  Start warm compresses both eyes 2 times a day for 30 minutes  Lid scrubs after compresses with Jaya's baby shampoo diluted in water both eyes 2 times a day  -continue with Erythromycin ophthalmic mer both eyes bid  -     erythromycin (Romycin) 5 mg/gram (0.5 %) ophthalmic ointment; Apply to skin surface of eyelid margins after compresses and lid scrubs bid

## 2025-05-14 ENCOUNTER — APPOINTMENT (OUTPATIENT)
Dept: OPHTHALMOLOGY | Facility: CLINIC | Age: 45
End: 2025-05-14
Payer: MEDICAID

## 2025-05-14 DIAGNOSIS — H01.02A SQUAMOUS BLEPHARITIS OF UPPER AND LOWER EYELIDS OF BOTH EYES: ICD-10-CM

## 2025-05-14 DIAGNOSIS — H01.02B SQUAMOUS BLEPHARITIS OF UPPER AND LOWER EYELIDS OF BOTH EYES: ICD-10-CM

## 2025-05-14 DIAGNOSIS — H16.8 MARGINAL KERATITIS: Primary | ICD-10-CM

## 2025-05-14 DIAGNOSIS — R73.03 PREDIABETES: ICD-10-CM

## 2025-05-14 PROCEDURE — 92012 INTRM OPH EXAM EST PATIENT: CPT | Performed by: OPHTHALMOLOGY

## 2025-05-14 ASSESSMENT — ENCOUNTER SYMPTOMS
CONSTITUTIONAL NEGATIVE: 0
NEUROLOGICAL NEGATIVE: 0
EYES NEGATIVE: 1
HEMATOLOGIC/LYMPHATIC NEGATIVE: 0
ALLERGIC/IMMUNOLOGIC NEGATIVE: 0
GASTROINTESTINAL NEGATIVE: 0
ENDOCRINE NEGATIVE: 0
RESPIRATORY NEGATIVE: 0
PSYCHIATRIC NEGATIVE: 0
CARDIOVASCULAR NEGATIVE: 0
MUSCULOSKELETAL NEGATIVE: 0

## 2025-05-14 ASSESSMENT — VISUAL ACUITY
OD_SC: 20/20
OS_SC: 20/25
METHOD: SNELLEN - LINEAR

## 2025-05-14 ASSESSMENT — TONOMETRY
OD_IOP_MMHG: 11
OS_IOP_MMHG: 11
IOP_METHOD: GOLDMANN APPLANATION

## 2025-05-14 ASSESSMENT — EXTERNAL EXAM - RIGHT EYE: OD_EXAM: NORMAL

## 2025-05-14 ASSESSMENT — SLIT LAMP EXAM - LIDS
COMMENTS: MINIMAL CRUSTING
COMMENTS: MINIMAL CRUSTING

## 2025-05-14 ASSESSMENT — EXTERNAL EXAM - LEFT EYE: OS_EXAM: NORMAL

## 2025-05-14 NOTE — PROGRESS NOTES
Assessment/Plan   Diagnoses and all orders for this visit:  Marginal keratitis  Squamous blepharitis of upper and lower eyelids of both eyes  -improving  -continue with Tobradex ophthalmic solution both eyes qid  -continue with warm compresses and lid scrubs both eyes bid  -continue with Erythromycin ophthalmic mer after compresses and lid scrubs bid  -continue with artificial tears  both eyes qid    Prediabetes  continue to monitor    Return in   1 week  for follow up or sooner if having any problems

## 2025-05-27 ENCOUNTER — APPOINTMENT (OUTPATIENT)
Dept: OPHTHALMOLOGY | Facility: CLINIC | Age: 45
End: 2025-05-27
Payer: MEDICAID

## 2025-05-27 DIAGNOSIS — H16.8 MARGINAL KERATITIS: ICD-10-CM

## 2025-05-27 DIAGNOSIS — H01.02A SQUAMOUS BLEPHARITIS OF UPPER AND LOWER EYELIDS OF BOTH EYES: Primary | ICD-10-CM

## 2025-05-27 DIAGNOSIS — H01.02B SQUAMOUS BLEPHARITIS OF UPPER AND LOWER EYELIDS OF BOTH EYES: Primary | ICD-10-CM

## 2025-05-27 PROCEDURE — 92012 INTRM OPH EXAM EST PATIENT: CPT | Performed by: OPHTHALMOLOGY

## 2025-05-27 ASSESSMENT — ENCOUNTER SYMPTOMS
GASTROINTESTINAL NEGATIVE: 0
RESPIRATORY NEGATIVE: 0
HEMATOLOGIC/LYMPHATIC NEGATIVE: 0
CONSTITUTIONAL NEGATIVE: 0
EYES NEGATIVE: 1
NEUROLOGICAL NEGATIVE: 0
ALLERGIC/IMMUNOLOGIC NEGATIVE: 0
ENDOCRINE NEGATIVE: 0
MUSCULOSKELETAL NEGATIVE: 0
CARDIOVASCULAR NEGATIVE: 0
PSYCHIATRIC NEGATIVE: 0

## 2025-05-27 ASSESSMENT — TONOMETRY
OS_IOP_MMHG: 11
IOP_METHOD: GOLDMANN APPLANATION
OD_IOP_MMHG: 12

## 2025-05-27 ASSESSMENT — EXTERNAL EXAM - RIGHT EYE: OD_EXAM: NORMAL

## 2025-05-27 ASSESSMENT — SLIT LAMP EXAM - LIDS
COMMENTS: MINIMAL CRUSTING
COMMENTS: MINIMAL CRUSTING

## 2025-05-27 ASSESSMENT — VISUAL ACUITY
OD_SC: 20/20
OS_SC: 20/25+2
METHOD: SNELLEN - LINEAR

## 2025-05-27 ASSESSMENT — EXTERNAL EXAM - LEFT EYE: OS_EXAM: NORMAL

## 2025-05-27 NOTE — PROGRESS NOTES
Assessment/Plan   Diagnoses and all orders for this visit:  Squamous blepharitis of upper and lower eyelids of both eyes  -continue with warm compresses both eyes bid for 30 minutes  -continue with Erythromycin ophthalmic ointment after compresses bid    Marginal keratitis  -improving  Start to taper off Tobradex: tid for 3 days, then bid for 3 days, then every day for 3 days, then stop    Return in  1  month(s) for follow up or sooner if having any problems

## 2025-07-08 ENCOUNTER — APPOINTMENT (OUTPATIENT)
Dept: OPHTHALMOLOGY | Facility: CLINIC | Age: 45
End: 2025-07-08
Payer: MEDICAID

## 2025-08-07 ENCOUNTER — APPOINTMENT (OUTPATIENT)
Dept: BEHAVIORAL HEALTH | Facility: CLINIC | Age: 45
End: 2025-08-07
Payer: MEDICAID

## 2025-08-11 ENCOUNTER — APPOINTMENT (OUTPATIENT)
Dept: BEHAVIORAL HEALTH | Facility: CLINIC | Age: 45
End: 2025-08-11
Payer: MEDICAID

## 2025-08-11 DIAGNOSIS — F43.23 ADJUSTMENT DISORDER WITH MIXED ANXIETY AND DEPRESSED MOOD: ICD-10-CM

## 2025-08-11 PROCEDURE — 90791 PSYCH DIAGNOSTIC EVALUATION: CPT | Performed by: PSYCHOLOGIST

## 2025-08-19 ENCOUNTER — APPOINTMENT (OUTPATIENT)
Dept: OPHTHALMOLOGY | Facility: CLINIC | Age: 45
End: 2025-08-19
Payer: MEDICAID

## 2025-08-25 ENCOUNTER — APPOINTMENT (OUTPATIENT)
Dept: BEHAVIORAL HEALTH | Facility: CLINIC | Age: 45
End: 2025-08-25
Payer: MEDICAID

## 2025-08-25 DIAGNOSIS — F41.0 PANIC DISORDER: ICD-10-CM

## 2025-08-25 DIAGNOSIS — F43.23 ADJUSTMENT DISORDER WITH MIXED ANXIETY AND DEPRESSED MOOD: ICD-10-CM

## 2025-08-25 PROCEDURE — 90837 PSYTX W PT 60 MINUTES: CPT | Performed by: PSYCHOLOGIST

## 2025-09-09 ENCOUNTER — APPOINTMENT (OUTPATIENT)
Dept: BEHAVIORAL HEALTH | Facility: CLINIC | Age: 45
End: 2025-09-09
Payer: MEDICAID

## 2025-10-15 ENCOUNTER — APPOINTMENT (OUTPATIENT)
Dept: OPHTHALMOLOGY | Facility: CLINIC | Age: 45
End: 2025-10-15
Payer: MEDICAID